# Patient Record
Sex: MALE | Race: WHITE | Employment: FULL TIME | URBAN - METROPOLITAN AREA
[De-identification: names, ages, dates, MRNs, and addresses within clinical notes are randomized per-mention and may not be internally consistent; named-entity substitution may affect disease eponyms.]

---

## 2022-01-04 PROBLEM — M89.8X1 PAIN OF RIGHT SCAPULA: Status: ACTIVE | Noted: 2022-01-04

## 2022-01-10 ENCOUNTER — HOSPITAL ENCOUNTER (OUTPATIENT)
Dept: PHYSICAL THERAPY | Age: 57
Discharge: HOME | End: 2022-01-10
Attending: ORTHOPAEDIC SURGERY
Payer: COMMERCIAL

## 2022-01-10 DIAGNOSIS — M89.8X1 PAIN OF RIGHT SCAPULA: ICD-10-CM

## 2022-01-10 DIAGNOSIS — S24.8XXA INJURY OF LONG THORACIC NERVE, INITIAL ENCOUNTER: ICD-10-CM

## 2022-01-10 DIAGNOSIS — G25.89 SCAPULAR DYSKINESIS: ICD-10-CM

## 2022-01-10 DIAGNOSIS — M25.511 RIGHT SHOULDER PAIN, UNSPECIFIED CHRONICITY: ICD-10-CM

## 2022-01-10 PROCEDURE — 97140 MANUAL THERAPY 1/> REGIONS: CPT

## 2022-01-10 PROCEDURE — 97110 THERAPEUTIC EXERCISES: CPT

## 2022-01-10 PROCEDURE — 97161 PT EVAL LOW COMPLEX 20 MIN: CPT

## 2022-01-10 NOTE — PROGRESS NOTES
George BLAIR Delio  : 1965  Payor: Paulina Tiffany / Plan: [facility] / Product Type: PPO /  [address] [place] [address], 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 Pain: Initial:4/10  Medications Last Reviewed:  1/10/2022     Post Session: 3/10   Updated Objective Findings: See Initial Eval for more details. TREATMENT:   THERAPEUTIC EXERCISE: (15 minutes):  Exercises per grid below to improve mobility, strength and balance. Required minimal visual, verbal and manual cues to promote proper body alignment and promote proper body posture. Progressed resistance and complexity of movement as indicated.      Date:  1/10/2022 Date:   Date:     Activity/Exercise Parameters Parameters Parameters   Education HEP, POC, PT goals, anatomy/pathology     UBE      Rings      PCS [id number]     Doorway stretch 2 way [id number]     UT LS stretch [id number]     Rhomboid hang stretch [id number] Bilateral ER with SR 2x10 orange                       THERAPEUTIC ACTIVITY: ( 0 minutes): Activities per gid below to improve functional movement related mobility, strength and balance to improve neuro-muscular carryover to daily functional activities for improving patient's quality of life. Required visual, verbal and manual cues to promote proper body alignment and promote proper body posture/mechanics. Progressed resistance and complexity of movement as indicated. Date:  1/10/2022 Date:   Date:     Activity/Exercise Parameters Parameters Parameters                                                                               MANUAL THERAPY: (8 minutes): Joint mobilization, Soft tissue mobilization was utilized and necessary because of the patient's restricted joint motion and restricted motion of soft tissue mobility. Date  1/10/2022    Technique Used Grade  Level # Time(s) Effect while being performed          Scapular mobilization II III right 5min Improved Mobility          GH Mobs III right 3min  Improved FE                                     HEP Log Date 1.    1/10/2022   2.  1/10/2022   3. 1/10/2022   4.    5.           [employer] Portal  Treatment/Session Summary:    Response to Treatment: Pt demonstrated understanding of POC and initial HEP. No increase in pain or adverse reactions. Communication/Consultation:  POC, HEP, PT goals, Faxed initial evaluation to MD.   Equipment provided today: HEP Handout   Recommendations/Intent for next treatment session:   Next visit will focus on Manual Therapy Core Stability Pain Science Education RTC strengthening soft tissue mobilization Scapular strengthening. Treatment Plan of Care Effective Dates: 1/10/2022 TO 3/11/2022 (60 days).   Frequency/Duration: 2 times a week for 60 Days             Total Treatment Billable Duration:   23  Rx plus Eval   PT Patient Time In/Time Out  Time In: 0930  Time Out: [address] [facility] Fifi Rome, PT    Future Appointments   Date Time Provider Yissel Das   1/13/2022  2:30 PM Henrey Radar, PT [facility]   1/17/2022  1:45 PM [facility]   1/20/2022  2:30 PM [facility] Radar, [facility]   1/24/2022  1:45 PM [facility] Radar, [facility]   1/27/2022  2:30 PM [facility] Radar, PT [facility]   1/31/2022  1:45 PM Henrey Radar, [facility]   2/3/2022  2:30 PM [facility] Radar, [facility]   2/7/2022  1:45 PM Henrey Radar, [facility]   2/10/2022  2:30 PM Henrey Radar, [facility]   2/14/2022  1:45 PM Henrey Radar, PT [facility]   2/17/2022  2:30 PM Henrey Radar, [facility]   2/21/2022  1:45 PM Hen[facility] Radar, [facility]   2/24/2022  2:30 PM Henrey Radar, PT [facility]

## 2022-01-10 NOTE — THERAPY EVALUATION
George Kebede  : 1965      Payor: Lavern Cruz / Plan: [place] [employer] [facility] / Product Type: PPO /    Dawit Nena at [address] [facility]. 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 Sherrell Clark presents to physical therapy with decreased strength, ROM, joint mobility, functional mobility. These S/S are consistent with referring dignosis. Patient will benefit from skilled physical therapy for manual therapeutic techniques (as appropriate), therapeutic exercises and activities, neuromuscular re-education, and comprehensive home exercises program to address current impairments and functional limitations. George Kebede will benefit from skilled PT (medically necessary) in order to address above deficits affecting participation in basic ADLs and overall functional tolerance.     PROBLEM LIST (Impacting functional limitations):  · Decreased Strength  · Decreased ADL/Functional Activities  · Increased Pain  · Decreased Activity Tolerance  · Increased Shortness of Breath  · Decreased Flexibility/Joint Mobility  · Decreased [employer] with Home Exercise Program INTERVENTIONS PLANNED:  1. Cold  2. Family Education  3. Home Exercise Program (HEP)  4. Manual Therapy  5. Neuromuscular Re-education/Strengthening  6. Range of Motion (ROM)  7. Therapeutic Activites  8. Therapeutic Exercise/Strengthening  9. Transfer Training  10. Ultrasound   TREATMENT PLAN:  Effective Dates: 1/10/2022 TO 3/11/2022 (60 days). Frequency/Duration: 2 times a week for 60 Days  GOALS: (Goals have been discussed and agreed upon with patient.)     Short-Term Goals~4 weeks  Goal Met   1. George Kebede will report <=4/10 pain with driving as well as minimal/no difficulty. 1.  [] Date:   2. George PINTO Peoples will demonstrate improvement in active shoulder FE to >160 degrees to increase UE function and participation in ADLs. 2.  [] Date:   3. George Kebede will demonstrate demonstrate improvement in active shoulder Abd to >150 degrees to increase UE function and participation in ADLs. 3.  [] Date:   4. Mary Marie will show a greater than 3 point decrease on the DASH in order to show an increase in upper extremity function. 4.  [] Date:   5. George Kebede will be independent in all HEP 5.  [] Date:   6.  6.  [] Date:         Long Term Goals~8 weeks Goal Met   1. George Kebede will show full ROM of the UE in order to return to full functional mobility  1. [] Date:   2. Mary Marie will show a greater than 6 point decrease on the DASH in order to show an increase in upper extremity function 2. [] Date:   3. Mary Marie will report doing hair/bathing without difficulty and <=2/10 pain in order to be independent with ADL's 3.  [] Date:   4. George Kebede will be independent in all advanced HEP 4.  [] Date:            Outcome Measure: Tool Used: Disabilities of the Arm, Shoulder and Hand (DASH) Questionnaire - Quick Version  Score:  Initial: 17/55  Most Recent: X/55 (Date: -- )   Interpretation of Score:  The DASH is designed to measure the activities of daily living in person's with upper extremity dysfunction or pain. Each section is scored on a 1-5 scale, 5 representing the greatest disability. The scores of each section are added together for a total score of 55. Medical Necessity:   · Skilled intervention continues to be required due to deficits and impairments seen upon initial evaluation affecting patient's participation in ADLs and functional tasks. Reason for Services/Other Comments:  · Patient continues to require skilled intervention due to deficits and impairments seen upon initial evaluation affecting patient's participation in ADLs and functional tasks. Total Treatment Duration:  PT Patient Time In/Time Out  Time In: 0930  Time Out: 1030    Rehabilitation Potential For Stated Goals: excellent  Regarding George PINTO Peoples's therapy, I certify that the treatment plan above will be carried out by a therapist or under their direction. Thank you for this referral,  Juwan Dominguez, PT     Referring Physician Signature: Keily Daigle MD _______________________________ Date _____________            HISTORY:   History of Present Injury/Illness (Reason for Referral):  Patient reports hitting the right side of mid back at scapula on a boxed wall receptacle while sitting down. He states having evaluation performed by MD for Cervical or Thoracic involvement with negative results. He has had a recent orthopedic evaluation Right Injury of long thoracic nerve and Scapular dyskinesis. -Present symptoms/complaints (on day of evaluation)  Pain Scale:  · Current: 4/10  · Best: 0/10  · Worst: 7/10    · Aggravating factors: Lifting, Carrying, Overhead activities and Driving  · Relieving factors: rest by side  · Irritability: Medium (Onset of pain is equal to alleviation)    Dominant Side:  right  Past Medical History/Comorbidities:   Mr. Tatiana Bautista  has no past medical history on file. Mr. Tatiana Bautista  has no past surgical history on file.   Social History/Living Environment:     lives with wife  Prior Level of Function/Work/Activity:  Normal  Other Clinical Tests:         Nerve Conduction Study scheduled  Current Medications:    Current Outpatient Medications:     cyanocobalamin 1,000 mcg tablet, 1,000 mcg daily. , Disp: , Rfl:     DULoxetine (CYMBALTA) 30 mg capsule, , Disp: , Rfl:     gabapentin (NEURONTIN) 300 mg capsule, , Disp: , Rfl:     metFORMIN ER (GLUCOPHAGE XR) 500 mg tablet, , Disp: , Rfl:     ibuprofen (MOTRIN) 800 mg tablet, Take 1 Tab by mouth every eight (8) hours as needed for Pain., Disp: 90 Tab, Rfl: 1    glucose blood VI test strips (blood glucose test) strip, by Not Applicable route three (3) times daily. , Disp: , Rfl:     OneTouch Ultra Blue Test Strip strip, , Disp: , Rfl:     sildenafil citrate (VIAGRA) 100 mg tablet, Take 100 mg by mouth as needed. , Disp: , Rfl:     Tobradex ophthalmic ointment, APPLY BY OPHTHALMIC ROUTE 4 TIMES A DAY X 1 WEEK, 2 TIMES A DAY X 1 WEEK, Disp: , Rfl:         Ambulatory/Rehab Services H2 Model Falls Risk Assessment    Risk Factors:       (1)  Gender [Male] Ability to Rise from Chair:       (0)  Ability to rise in a single movement    Falls Prevention Plan:       No modifications necessary   Total: (5 or greater = High Risk): 1    ©2010 [facility] [employer]. All Rights Reserved. [facility] Patent #[id number]. Federal Law prohibits the replication, distribution or use without written permission from [facility] of [address] [place] [address]       Date Last Reviewed:  1/10/2022   Number of Personal Factors/Comorbidities that affect the Plan of Care: 0: LOW COMPLEXITY   EXAMINATION:   Observation/Orthostatic Postural Assessment:     Forward Head, Rounded Shoulders and Thoracic Kyphosis  Palpation:          Tenderness to distal medial  Boarder of right scapula  ROM:    AROM/PROM         Joint: Eval Date: 1/10/2022  Re-Assess Date:  Re-Assess Date:    ACTIVE ROM (standing) RIGHT LEFT RIGHT LEFT RIGHT LEFT   Shoulder Flexion 145 145           Shoulder Abduction 140 140           Shoulder Internal Rotation (Apley's) T10 T7           Shoulder External Rotation (Apley's) T2 T4           Elbow ROM WNL WNL           PASSIVE ROM (supine)             Shoulder Flexion 155 155           Shoulder Abduction 145 145           Shoulder Internal Rotation WNL WNL           Shoulder External Rotation WNL WNL                                                    Strength:    Joint: Eval Date: 1/10/2022  Re-Assess Date:  Re-Assess Date:     RIGHT LEFT RIGHT LEFT RIGHT LEFT   Shoulder Flexion 4+/5 5/5           Shoulder Abduction  (C5) 4+/5 5/5           Shoulder Internal Rotation 5/5 5/5           Shoulder External Rotation 4+/5 5/5           Elbow Flexion  (C6) 5/5 5/5           Elbow Extension (C7) 5/5 5/5           Scapula 4/5 4+/5                                         Manual:  Joint Directon Grade Treatment Effect   Scapular/Thoracic Lateral Glide and Medial Glide II and III Reproduced Symptoms   GH Distraction and Inferior Glide III Unremarkable             Special Tests:     Ellen: Negative   Neer's: Negative   Doniphan: Negative   Speed:Negative    Neurological Screen: Assessed @ Initial Visit    Radiating symptoms? Yes/No=poorly localized  Functional Mobility:  Assessed @ Initial Visit         Body Structures Involved:  1. Bones  2. Joints  3. Muscles  4. Ligaments Body Functions Affected:  1. Sensory/Pain  2. Neuromusculoskeletal  3. Movement Related Activities and Participation Affected:  1. Mobility  2.  Self Care   Number of elements that affect the Plan of Care: 1-2: LOW COMPLEXITY   CLINICAL PRESENTATION:   Presentation: Stable and uncomplicated: LOW COMPLEXITY   CLINICAL DECISION MAKING:      Use of outcome tool(s) and clinical judgement create a POC that gives a: Clear prediction of patient's progress: LOW COMPLEXITY     See associated treatment note for treatment provided today.     Future Appointments   Date Time Provider Yissel Das   1/13/2022  2:30 PM Boyle Savin, PT [facility]   1/17/2022  1:45 PM Boyle Savin, PT [facility]   1/20/2022  2:30 PM Boyle Savin, PT [facility]   1/24/2022  1:45 PM Ivan [facility], PT [facility]   1/27/2022  2:30 PM Ivan [facility]   1/31/2022  1:45 PM Boyle Savin, [facility]   2/3/2022  2:30 PM Ivan Savin, PT [facility]   2/7/2022  1:45 PM Boyle Savin, PT [facility]   2/10/2022  2:30 PM Boyle Savin, PT [facility]   2/14/2022  1:45 PM Boyle Savin, PT [facility]   2/17/2022  2:30 PM Boyle Savin, PT [facility]   2/21/2022  1:45 PM Ivan Savin, PT [facility]   2/24/2022  2:30 PM Ivan Savin, PT [facility]         Suraj Vazquez, PT

## 2022-01-13 ENCOUNTER — HOSPITAL ENCOUNTER (OUTPATIENT)
Dept: PHYSICAL THERAPY | Age: 57
Discharge: HOME | End: 2022-01-13
Attending: ORTHOPAEDIC SURGERY
Payer: COMMERCIAL

## 2022-01-13 PROCEDURE — 97110 THERAPEUTIC EXERCISES: CPT

## 2022-01-13 NOTE — PROGRESS NOTES
George Kebede  : 1965  Payor: Darcia Boxer / Plan: [facility] / Product Type: PPO /  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 Pain: Initial:2/10  Medications Last Reviewed:  2022     Post Session: 1/10   Updated Objective Findings: See Initial Eval for more details. TREATMENT:   THERAPEUTIC EXERCISE: ( 40 minutes):  Exercises per grid below to improve mobility, strength and balance. Required minimal visual, verbal and manual cues to promote proper body alignment and promote proper body posture. Progressed resistance and complexity of movement as indicated.      Date:  1/10/2022 Date:  2022 Date:     Activity/Exercise Parameters Parameters Parameters   Education HEP, POC, PT goals, anatomy/pathology     UBE   L 3    Rings  [id number]    PCS [id number]    Doorway stretch 2 way [id number] UT LS stretch [id number] [id number]    Rhomboid hang stretch [id number]    Foam roll  3way 5min    Open Book  10x R/L    Bilateral ER with SR 2x10 orange 2x10 red    Pull apart      Row  2x10 red    Low Row  2x10 red    Lat pull                        THERAPEUTIC ACTIVITY: ( 0 minutes): Activities per gid below to improve functional movement related mobility, strength and balance to improve neuro-muscular carryover to daily functional activities for improving patient's quality of life. Required visual, verbal and manual cues to promote proper body alignment and promote proper body posture/mechanics. Progressed resistance and complexity of movement as indicated. Date:  1/13/2022 Date:   Date:     Activity/Exercise Parameters Parameters Parameters                                                                               MANUAL THERAPY: (8 minutes): Joint mobilization, Soft tissue mobilization was utilized and necessary because of the patient's restricted joint motion and restricted motion of soft tissue mobility. Date  1/13/2022    Technique Used Grade  Level # Time(s) Effect while being performed          Scapular mobilization II III right  Improved Mobility          GH Mobs III right  Improved FE                                     HEP Log Date 1.    1/13/2022   2.  1/13/2022   3. 1/13/2022   4.    5.           [employer] Portal  Treatment/Session Summary:    Response to Treatment: Pt demonstrated understanding of POC and initial HEP. No increase in pain or adverse reactions. Communication/Consultation:  POC, HEP, PT goals, Faxed initial evaluation to MD.   Equipment provided today: HEP Handout   Recommendations/Intent for next treatment session:   Next visit will focus on Manual Therapy Core Stability Pain Science Education RTC strengthening soft tissue mobilization Scapular strengthening. Treatment Plan of Care Effective Dates: 1/10/2022 TO 3/11/2022 (60 days). Frequency/Duration: 2 times a week for 60 Days             Total Treatment Billable Duration:   23  Rx plus [address], PT    Future Appointments   Date Time Provider Yissel Das   1/17/2022  1:45 PM Dennice Flirt, PT [facility]   1/20/2022  2:30 PM Dennice Flirt, PT [facility]   1/24/2022  1:45 PM Dennice Flirt, PT [facility]   1/27/2022  2:30 PM Dennice Flirt, PT [facility]   1/31/2022  1:45 PM Dennice Flirt, PT [facility]   2/1/2022  8:15 AM Saira Nguyễn MD [facility]   2/3/2022  2:30 PM Dennice Flirt, PT [facility]   2/7/2022  1:45 PM Dennice Flirt, PT [facility]   2/10/2022  2:30 PM Dennice Flirt, PT [facility]   2/14/2022  1:45 PM Dennice Flirt, PT [facility]   2/17/2022  2:30 PM Dennice Flirt, PT [facility]   2/21/2022  1:45 PM Dennice Flirt, PT [facility]   2/24/2022  2:30 PM Dennice Flirt, PT [facility]

## 2022-01-20 ENCOUNTER — HOSPITAL ENCOUNTER (OUTPATIENT)
Dept: PHYSICAL THERAPY | Age: 57
Discharge: HOME | End: 2022-01-20
Attending: ORTHOPAEDIC SURGERY
Payer: COMMERCIAL

## 2022-01-20 PROCEDURE — 97110 THERAPEUTIC EXERCISES: CPT

## 2022-01-20 NOTE — PROGRESS NOTES
George Kebede  : 1965  Payor: Florencia Kerr / Plan: [facility] / Product Type: PPO /  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 Pain: Initial:4/10  Medications Last Reviewed:  2022     Post Session: 1/10   Updated Objective Findings: See Initial Eval for more details. TREATMENT:   THERAPEUTIC EXERCISE: ( 42 minutes):  Exercises per grid below to improve mobility, strength and balance. Required minimal visual, verbal and manual cues to promote proper body alignment and promote proper body posture. Progressed resistance and complexity of movement as indicated.      Date:  1/10/2022 Date:  2022 Date:  2022   Activity/Exercise Parameters Parameters Parameters   Education HEP, POC, PT goals, anatomy/pathology     UBE  4/4 L 3 4/ L 3   Rings  57s28lgr 41i15qin   PCS 6q90btz 4w55xiu 7w42kwe   Doorway stretch 2 way 6a30euq     UT LS stretch 8n90abc 4z30ank    Rhomboid hang stretch 7h78fog 5b60fgc 7s64qsh   Foam roll  3way 5min 3way 5min   Open Book  10x R/L    Bilateral ER with SR 2x10 orange 2x10 red    Pull apart      Row  2x10 red 2x10 23# cable   Low Row  2x10 red    Lat pull   2x10 27# cable   Push up   2x8 TRX   Wall slide with lift off   2x8 ER band   ER walkout   68x4tzj 3#         THERAPEUTIC ACTIVITY: ( 0 minutes): Activities per gid below to improve functional movement related mobility, strength and balance to improve neuro-muscular carryover to daily functional activities for improving patient's quality of life. Required visual, verbal and manual cues to promote proper body alignment and promote proper body posture/mechanics. Progressed resistance and complexity of movement as indicated. Date:  1/20/2022 Date:   Date:     Activity/Exercise Parameters Parameters Parameters                                                                               MANUAL THERAPY: (8 minutes): Joint mobilization, Soft tissue mobilization was utilized and necessary because of the patient's restricted joint motion and restricted motion of soft tissue mobility. Date  1/20/2022    Technique Used Grade  Level # Time(s) Effect while being performed          Scapular mobilization II III right  Improved Mobility          GH Mobs III right  Improved FE                                     HEP Log Date 1.    1/20/2022   2.  1/20/2022   3. 1/20/2022   4.    5.           [facility] Portal  Treatment/Session Summary:    Response to Treatment: Pt demonstrated understanding of POC and initial HEP. No increase in pain or adverse reactions.    Communication/Consultation:  POC, HEP, PT goals, Faxed initial evaluation to MD.   Equipment provided today: HEP Handout   Recommendations/Intent for next treatment session:   Next visit will focus on Manual Therapy Core Stability Pain Science Education RTC strengthening soft tissue mobilization Scapular strengthening. Treatment Plan of Care Effective Dates: 1/10/2022 TO 3/11/2022 (60 days).   Frequency/Duration: 2 times a week for 60 Days             Total Treatment Billable Duration:   42  Rx  PT Patient Time In/Time Out  Time In: 1430  Time Out: Celsa Pollack 52, PT    Future Appointments   Date Time Provider Yissel Das   1/24/2022  1:45 PM [facility]   1/27/2022  2:30 PM [facility]   1/31/2022  1:45 PM Arbie Dirk, PT [facility]   2/1/2022  8:15 AM Merary England MD [facility]   2/3/2022  2:30 PM Arbie Dirk, PT [facility]   2/7/2022  1:45 PM Arbie Dirk, PT [facility]   2/10/2022  2:30 PM Arbie Dirk, PT [facility]   2/14/2022  1:45 PM Arbie Dirk, PT [facility]   2/17/2022  2:30 PM Arbie Dirk, PT [facility]   2/21/2022  1:45 PM Arbie Dirk, PT [facility]   2/24/2022  2:30 PM Arbie Dirk, PT [facility]

## 2022-01-24 ENCOUNTER — HOSPITAL ENCOUNTER (OUTPATIENT)
Dept: PHYSICAL THERAPY | Age: 57
Discharge: HOME | End: 2022-01-24
Attending: ORTHOPAEDIC SURGERY
Payer: COMMERCIAL

## 2022-01-24 NOTE — PROGRESS NOTES
George Kebede  : 1965  Primary: [facility]  Secondary:  [facility] [place] [address]. [place] [address], [address]  [phone]   [phone]      PHYSICAL THERAPY    Patient unable to attend appointment today, had unexpected dental work done today.     Cecille Odonnell, PT

## 2022-01-27 ENCOUNTER — HOSPITAL ENCOUNTER (OUTPATIENT)
Dept: PHYSICAL THERAPY | Age: 57
Discharge: HOME | End: 2022-01-27
Attending: ORTHOPAEDIC SURGERY
Payer: COMMERCIAL

## 2022-01-27 PROCEDURE — 97110 THERAPEUTIC EXERCISES: CPT

## 2022-01-27 NOTE — PROGRESS NOTES
George Kebede  : 1965  Payor: Faviola Lazaro / Plan: [facility] / Product Type: PPO /  [address] [facility] at [address] [place] [address] [address] [place] [address], [facility], [address] [place] [address]  [phone]   [phone]                                                          Lucien Barahona MD      OUTPATIENT PHYSICAL THERAPY: Daily Treatment Note 2022 Visit Count:  4    ICD-10: Treatment Diagnosis:   Right shoulder pain, unspecified chronicity [M25.511]  Pain of right scapula [M89.8X1]  Injury of long thoracic nerve, 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 Pain: Initial:5/10  Medications Last Reviewed:  2022     Post Session: 2/10   Updated Objective Findings: See Initial Eval for more details. TREATMENT:   THERAPEUTIC EXERCISE: ( 43 minutes):  Exercises per grid below to improve mobility, strength and balance. Required minimal visual, verbal and manual cues to promote proper body alignment and promote proper body posture. Progressed resistance and complexity of movement as indicated.      Date:  1/10/2022 Date:  2022 Date:  2022 Date:  2022   Activity/Exercise Parameters Parameters Parameters    Education HEP, POC, PT goals, anatomy/pathology      UBE  4/4 L 3 4/4 L 3 4/4 L 4   Rings  98i51hcq 21o13yoe 21s24hsr   PCS 0m79rqe 4v21jvj 7t85dmu 3d02opb   Doorway stretch 2 way 3j25aom      UT LS stretch 3m72fqt 9i07vxb     Rhomboid hang stretch 8k63xyn 8m66njb 7k36dka 1w42oyv   Foam roll  3way 5min 3way 5min 5min   Open Book  10x R/L     Bilateral ER with SR 2x10 orange 2x10 red     Pull apart       Row  2x10 red 2x10 23# cable 2x10 27# cable   Low Row  2x10 red     Lat pull   2x10 27# cable 2x10 30# cable   Push up   2x8 TRX 2x8 TRX   Wall slide with lift off   2x8 ER band    ER walkout   54b6qru 3# 62g8bei 3#   Bear hug     2x10 red loop   Punch    2x10 13# cable   Y T    10x each SB   90/90 ER    10x SB         THERAPEUTIC ACTIVITY: ( 0 minutes): Activities per gid below to improve functional movement related mobility, strength and balance to improve neuro-muscular carryover to daily functional activities for improving patient's quality of life. Required visual, verbal and manual cues to promote proper body alignment and promote proper body posture/mechanics. Progressed resistance and complexity of movement as indicated. Date:  1/27/2022 Date:   Date:     Activity/Exercise Parameters Parameters Parameters                                                                               MANUAL THERAPY: (0 minutes): Joint mobilization, Soft tissue mobilization was utilized and necessary because of the patient's restricted joint motion and restricted motion of soft tissue mobility.         Date  1/27/2022    Technique Used Grade  Level # Time(s) Effect while being performed          Scapular mobilization II III right  Improved Mobility          GH Mobs III right  Improved FE                                     HEP Log Date 1.    1/27/2022   2.  1/27/2022   3. 1/27/2022   4.    5.           [facility] Portal  Treatment/Session Summary:    Response to Treatment: Patient was challenged with increased scapular strengthening and reported less pain   Communication/Consultation:  POC, HEP, PT goals, Faxed initial evaluation to MD.   Equipment provided today: HEP Handout   Recommendations/Intent for next treatment session:   Next visit will focus on Manual Therapy Core Stability Pain Science Education RTC strengthening soft tissue mobilization Scapular strengthening. Treatment Plan of Care Effective Dates: 1/10/2022 TO 3/11/2022 (60 days).   Frequency/Duration: 2 times a week for 60 Days             Total Treatment Billable Duration:   43  Rx  PT Patient Time In/Time Out  Time In: 1427  Time Out: [address], PT    Future Appointments   Date Time Provider Yissel Das   1/31/2022  1:45 PM Irwin Moat, PT [facility]   2/1/2022  8:15 AM Janee Pallas, MD [facility]   2/3/2022  2:30 PM Irwin Moat, PT [facility]   2/7/2022  1:45 PM [facility]   2/10/2022  2:30 PM Irwin Moat, [facility]   2/14/2022  1:45 PM Irwin Moat, PT [facility]   2/17/2022  2:30 PM Irwin Moat, PT [facility]   2/21/2022  1:45 PM Irwin Moat, PT [facility]   2/24/2022  2:30 PM Irwin Moat, PT [facility]

## 2022-01-31 ENCOUNTER — HOSPITAL ENCOUNTER (OUTPATIENT)
Dept: PHYSICAL THERAPY | Age: 57
Discharge: HOME | End: 2022-01-31
Attending: ORTHOPAEDIC SURGERY
Payer: COMMERCIAL

## 2022-01-31 PROCEDURE — 97110 THERAPEUTIC EXERCISES: CPT

## 2022-01-31 NOTE — PROGRESS NOTES
Care giver at the bedside asking if they can medicate the pt with their own evening (20:00 Amitiza 24mg/ 2100  Trazodone 50mg/ and Abilify 5mg )      Chalo German RN  07/06/18 0114 George BLAIR Peoples  : 1965  Payor: Paulina Tiffany / Plan: [facility] / Product Type: PPO /  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 TREATMENT:   THERAPEUTIC EXERCISE: ( 42 minutes):  Exercises per grid below to improve mobility, strength and balance. Required minimal visual, verbal and manual cues to promote proper body alignment and promote proper body posture. Progressed resistance and complexity of movement as indicated.      Date:  1/10/2022 Date:  2022 Date:  2022 Date:  2022 Date:  2022   Activity/Exercise Parameters Parameters Parameters     Education HEP, POC, PT goals, anatomy/pathology       UBE  4/4 L 3 4/4 L 3 4/4 L 4 8min hills L4   Rings  63b95wtk 08y06esy 55g71drl 76c30poa   PCS 5j50pph 7t14gmt 0z49woy 6u88esi 0f05utf   Doorway stretch 2 way 1x68ced       UT LS stretch 1j52mxl 4t61kqs      Rhomboid hang stretch 5s13bja 8i99eoo 3l84nup 7k74smm    Foam roll  3way 5min 3way 5min 5min    Open Book  10x R/L      Bilateral ER with SR 2x10 orange 2x10 red      Pull apart        Row  2x10 red 2x10 23# cable 2x10 27# cable 3x8 27# cable   Low Row  2x10 red      Lat pull   2x10 27# cable 2x10 30# cable 3x8 27# cable   Push up   2x8 TRX 2x8 TRX Bar rack 2x10   Wall slide with lift off   2x8 ER band     ER walkout   21g9yzc 3# 72g9dqf 3# 35o0jwz 7#   Bear hug     2x10 red loop    Punch    2x10 13# cable 2x10 13# cable   Y T    10x each SB 2x8 each bench   90/90 ER    10x SB 2x8 each bench   Wall FE slide w/ thoracic rotation     10x each           Radha Riff carry     2/15#  2x2 laps         THERAPEUTIC ACTIVITY: ( 0 minutes): Activities per gid below to improve functional movement related mobility, strength and balance to improve neuro-muscular carryover to daily functional activities for improving patient's quality of life. Required visual, verbal and manual cues to promote proper body alignment and promote proper body posture/mechanics. Progressed resistance and complexity of movement as indicated. Date:  1/31/2022 Date:   Date:     Activity/Exercise Parameters Parameters Parameters                                                                               MANUAL THERAPY: (0 minutes): Joint mobilization, Soft tissue mobilization was utilized and necessary because of the patient's restricted joint motion and restricted motion of soft tissue mobility. Date  1/31/2022    Technique Used Grade  Level # Time(s) Effect while being performed          Scapular mobilization II III right  Improved Mobility          GH Mobs III right  Improved FE                                     HEP Log Date 1.    1/31/2022   2.  1/31/2022   3. 1/31/2022   4. 5.           [facility] Portal  Treatment/Session Summary:    Response to Treatment: Patient had less scapular winging with strengthening activity   Communication/Consultation:  POC, HEP, PT goals, Faxed initial evaluation to MD.   Equipment provided today: HEP Handout   Recommendations/Intent for next treatment session:   Next visit will focus on Manual Therapy Core Stability Pain Science Education RTC strengthening soft tissue mobilization Scapular strengthening. Treatment Plan of Care Effective Dates: 1/10/2022 TO 3/11/2022 (60 days).   Frequency/Duration: 2 times a week for 60 Days             Total Treatment Billable Duration:   42  Rx  PT Patient Time In/Time Out  Time In: 1347  Time Out: [address], PT    Future Appointments   Date Time Provider Yissel Das   2/1/2022  8:15 AM Jaz Samuel MD [facility] MRMD   2/3/2022  2:30 PM Teresia Moros, PT [facility]   2/7/2022  1:45 PM Teresia Moros, PT [facility]   2/10/2022  2:30 PM [facility] Mor[facility], [facility]   2/14/2022  1:45 PM Teresia Moros, PT [facility]   2/17/2022  2:30 PM Teresia Moros, PT [facility]   2/21/2022  1:45 PM Teresia Moros, PT [facility]   2/24/2022  2:30 PM Teresia Moros, [facility]

## 2022-02-03 ENCOUNTER — HOSPITAL ENCOUNTER (OUTPATIENT)
Dept: PHYSICAL THERAPY | Age: 57
Discharge: HOME | End: 2022-02-03
Attending: ORTHOPAEDIC SURGERY
Payer: COMMERCIAL

## 2022-02-03 PROCEDURE — 97110 THERAPEUTIC EXERCISES: CPT

## 2022-02-03 NOTE — PROGRESS NOTES
George Kebede  : 1965  Payor: Albertina Russo / Plan: [facility] / Product Type: PPO /  Tes[address] Paulino at [address] 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 TREATMENT:   THERAPEUTIC EXERCISE: ( 43 minutes):  Exercises per grid below to improve mobility, strength and balance. Required minimal visual, verbal and manual cues to promote proper body alignment and promote proper body posture. Progressed resistance and complexity of movement as indicated.      Date:  2022 Date:  2022 Date:  2022 Date:  2022 Date:  2/3/2022   Activity/Exercise Parameters Parameters      Education        UBE 4/4 L 3 4/4 L 3 4/4 L 4 8min hills L4 8min hills L5   29 keyona   Rings 84q75xmc 96q07wsw 63o97myz 46y19irx 84v48izv   PCS 0t57jfq 0p91tir 5g40dde 3c99dab 0g66yux   Doorway stretch        UT LS stretch 7e33huy       Rhomboid hang stretch 8c35rtm 5c14ewm 5d47pwm  8c72mlc   Foam roll 3way 5min 3way 5min 5min     Open Book 10x R/L       Bilateral ER with SR 2x10 red       Pull apart        Row 2x10 red 2x10 23# cable 2x10 27# cable 3x8 30# cable 3x8 30# cable   Low Row 2x10 red       Lat pull  2x10 27# cable 2x10 30# cable 3x8 33# cable 3x8 37# cable   Push up  2x8 TRX 2x8 TRX Bar rack 2x10 Bar rack 2x10   Wall slide with lift off  2x8 ER band      ER walkout  90q0apv 3# 02s3itw 3# 84l7yxj 7#    Bear hug    2x10 red loop     Punch   2x10 13# cable 2x10 13# cable 3x5 17# cable   Y T   10x each SB 2x8 each bench 2x10 yellow standing   90/90 ER   10x SB 2x8 each bench 2x10 yellow standing   Wall FE slide w/ thoracic rotation    10x each    Drawing bow     3x10 R/L green   [employer] carry    2/15#  2x2 laps 2/15#  2x2 laps   3 angle ER     2x30 each green                         THERAPEUTIC ACTIVITY: ( 0 minutes): Activities per gid below to improve functional movement related mobility, strength and balance to improve neuro-muscular carryover to daily functional activities for improving patient's quality of life. Required visual, verbal and manual cues to promote proper body alignment and promote proper body posture/mechanics. Progressed resistance and complexity of movement as indicated. Date:  2/3/2022 Date:   Date:     Activity/Exercise Parameters Parameters Parameters                                                                               MANUAL THERAPY: (0 minutes): Joint mobilization, Soft tissue mobilization was utilized and necessary because of the patient's restricted joint motion and restricted motion of soft tissue mobility.         Date  2/3/2022    Technique Used Grade  Level # Time(s) Effect while being performed          Scapular mobilization II III right  Improved Mobility          [facility] right  Improved FE                                     HEP Log Date 1.    2/3/2022   2.  2/3/2022   3. 2/3/2022   4.    5.           [facility] Portal  Treatment/Session Summary:    Response to Treatment: Patient progressing well with strength and stability   Communication/Consultation:  Exercise progression   Equipment provided today: Not Today   Recommendations/Intent for next treatment session:   Next visit will focus on Manual Therapy Core Stability Pain Science Education RTC strengthening soft tissue mobilization Scapular strengthening. Treatment Plan of Care Effective Dates: 1/10/2022 TO 3/11/2022 (60 days).   Frequency/Duration: 2 times a week for 60 Days             Total Treatment Billable Duration:   43  Rx  PT Patient Time In/Time Out  Time In: 1430  Time Out: Celsa Pollack 52, PT    Future Appointments   Date Time Provider Yissel Das   2/7/2022  1:45 PM Constance Panning, PT [facility]   2/10/2022  2:30 PM Constance Panning, PT [facility]   2/14/2022  1:45 PM Constance Panning, PT [facility]   2/17/2022  2:30 PM Constance Panning, PT [facility]   2/21/2022  1:45 PM Constance Panning, [facility]   2/24/2022  2:30 PM [facility]anning, [facility]

## 2022-02-07 ENCOUNTER — HOSPITAL ENCOUNTER (OUTPATIENT)
Dept: PHYSICAL THERAPY | Age: 57
Discharge: HOME | End: 2022-02-07
Attending: ORTHOPAEDIC SURGERY
Payer: COMMERCIAL

## 2022-02-07 PROCEDURE — 97110 THERAPEUTIC EXERCISES: CPT

## 2022-02-07 NOTE — PROGRESS NOTES
George Kebede  : 1965  Payor: Yasmani Azevedo / Plan: [facility] / Product Type: PPO /  [address] 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 Required minimal visual, verbal and manual cues to promote proper body alignment and promote proper body posture. Progressed resistance and complexity of movement as indicated.      Date:  2022 Date:  2022 Date:  2022 Date:  2/3/2022 Date:  2022   Activity/Exercise Parameters       Education        UBE 4/4 L 3 4/4 L 4 8min hills L4 8min hills L5   29 keyona 8min hills L6   31 keyona   Rings 04n04nei 60s11bbf 78l65zbt 81a79gbd 44j23gvd   PCS 9c57vbe 4h37zal 6d82izl 3a48hey 5p35wxx   Doorway stretch        UT LS stretch        Rhomboid hang stretch 3n61cqc 7z72hmi  5k81tdq    Foam roll 3way 5min 5min      Open Book        ER     Cable 3x8 R/L   Pull apart        Row 2x10 23# cable 2x10 27# cable 3x8 30# cable 3x8 30# cable 3x8 33# cable   Low Row        Lat pull 2x10 27# cable 2x10 30# cable 3x8 33# cable 3x8 37# cable 3x8 40# cable   Push up 2x8 TRX 2x8 TRX Bar rack 2x10 Bar rack 2x10    Plant Alt FE        Wall slide with lift off 2x8 ER band       ER walkout 90e5dgk 3# 04n0ysp 3# 66i9kkn 7#  11w8end 7#   Bear hug   2x10 red loop      Punch  2x10 13# cable 2x10 13# cable 3x5 17# cable    Y T  10x each SB 2x8 each bench 2x10 yellow standing 3x8 yellow standing   90/90 ER  10x SB 2x8 each bench 2x10 yellow standing 3x8 yellow standing   Wall FE slide w/ thoracic rotation   10x each     Drawing bow    3x10 R/L green 3x8 R/L red   Stanford carry   2/15#  2x2 laps 2/15#  2x2 laps 2/20#  2x2 laps   3 angle ER    2x30 each green    Ball wall CW CCW     2x20 each red   Bent over row              THERAPEUTIC ACTIVITY: ( 0 minutes): Activities per gid below to improve functional movement related mobility, strength and balance to improve neuro-muscular carryover to daily functional activities for improving patient's quality of life. Required visual, verbal and manual cues to promote proper body alignment and promote proper body posture/mechanics. Progressed resistance and complexity of movement as indicated. Date:  2/7/2022 Date:   Date:     Activity/Exercise Parameters Parameters Parameters                                                                               MANUAL THERAPY: (0 minutes): Joint mobilization, Soft tissue mobilization was utilized and necessary because of the patient's restricted joint motion and restricted motion of soft tissue mobility.         Date  2/7/2022    Technique Used Grade Level # Time(s) Effect while being performed          Scapular mobilization II III right  Improved Mobility          GH Mobs III right  Improved FE                                     HEP Log Date 1.    2/7/2022   2.  2/7/2022   3. 2/7/2022   4.    5.           [employer] Portal  Treatment/Session Summary:    Response to Treatment: Patient was challenged with increase but without right scapular pain   Communication/Consultation:  Exercise progression   Equipment provided today: Not Today   Recommendations/Intent for next treatment session:   Next visit will focus on Manual Therapy Core Stability Pain Science Education RTC strengthening soft tissue mobilization Scapular strengthening. Treatment Plan of Care Effective Dates: 1/10/2022 TO 3/11/2022 (60 days).   Frequency/Duration: 2 times a week for 60 Days             Total Treatment Billable Duration:   43  Rx  PT Patient Time In/Time Out  Time In: 1345  Time Out: 96 Celsa Rodriguez PT    Future Appointments   Date Time Provider Yissel Das   2/10/2022  2:30 PM Natividad Danger, PT [facility] [place] [facility]   2/14/2022  1:45 PM Natividad Danger, PT [facility]   2/17/2022  2:30 PM Natividad Danger, PT [facility]   2/22/2022  1:00 PM Natividad Danger, PT [facility]   2/24/2022  2:30 PM Natividad Danger, PT [facility]   2/28/2022  1:45 PM Natividad Danger, PT [facility]   3/3/2022  2:30 PM Natividad Danger, PT [facility]   3/7/2022  2:30 PM Natividad Danger, PT [facility]   3/10/2022  2:30 PM Natividad Danger, PT [facility]   3/14/2022  1:45 PM Natividad Danger, PT [facility]   3/17/2022  2:30 PM Natividad Danger, PT [facility]   3/21/2022  1:45 PM Natividad Danger, PT [facility]   3/24/2022  2:30 PM Natividad Danger, PT [facility]

## 2022-02-10 ENCOUNTER — HOSPITAL ENCOUNTER (OUTPATIENT)
Dept: PHYSICAL THERAPY | Age: 57
Discharge: HOME | End: 2022-02-10
Attending: ORTHOPAEDIC SURGERY
Payer: COMMERCIAL

## 2022-02-10 PROCEDURE — 97110 THERAPEUTIC EXERCISES: CPT

## 2022-02-10 NOTE — PROGRESS NOTES
George Kebede  : 1965  Payor: Eriberto Stinson / Plan: [facility] / Product Type: PPO /  [address] 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 Required minimal visual, verbal and manual cues to promote proper body alignment and promote proper body posture. Progressed resistance and complexity of movement as indicated.      Date:  2022 Date:  2022 Date:  2/3/2022 Date:  2022 Date:  2/10/2022   Activity/Exercise        Education        UBE 4/4 L 4 8min hills L4 8min hills L5   29 keyona 8min hills L6   31 keyona 8min hills L6   33 keyona   Rings 06y59ncf 59x32boo 84o14vdy 51c94pjg 81y08zye   PCS 4r26duy 9c33cgm 4m03vuo 2x42suf 7q26los   Doorway stretch        Rhomboid hang stretch 1t44nxc  7b30xof  8t76bas   Foam roll 5min       ER    Cable 3x8 R/L Cable 2x10 3# R/L   Row 2x10 27# cable 3x8 30# cable 3x8 30# cable 3x8 33# cable Bent over 2x10 15# R/L   Low Row        Lat pull 2x10 30# cable 3x8 33# cable 3x8 37# cable 3x8 40# cable 3x8 40# cable   Push up 2x8 TRX Bar rack 2x10 Bar rack 2x10  SB chair 2x10   Plank Alt FE        Wall slide with lift off        Wall walk lateral        ER walkout 62w0phz 3# 91p2lyx 7#  54g4phu 7#    Bear hug  2x10 red loop       Punch 2x10 13# cable 2x10 13# cable 3x5 17# cable     Y T 10x each SB 2x8 each bench 2x10 yellow standing 3x8 yellow standing SB 2x10 2#   90/90 ER 10x SB 2x8 each bench 2x10 yellow standing 3x8 yellow standing SB 2x10 2#   Wall FE slide w/ thoracic rotation  10x each      Drawing bow   3x10 R/L green 3x8 R/L red    Stanford carry  2/15#  2x2 laps 2/15#  2x2 laps 2/20#  2x2 laps    3 angle ER   2x30 each green  2x30 each red   Ball wall CW CCW    2x20 each red 2x20 each red   Overhead press     2/5# 2x10   TRX        RDL overhead        Squat to press        Lateral walk with punch                      THERAPEUTIC ACTIVITY: ( 0 minutes): Activities per gid below to improve functional movement related mobility, strength and balance to improve neuro-muscular carryover to daily functional activities for improving patient's quality of life. Required visual, verbal and manual cues to promote proper body alignment and promote proper body posture/mechanics. Progressed resistance and complexity of movement as indicated.      Date:  2/10/2022 Date:   Date:     Activity/Exercise Parameters Parameters Parameters                                                                               MANUAL THERAPY: (0 minutes): Joint mobilization, Soft tissue mobilization was utilized and necessary because of the patient's restricted joint motion and restricted motion of soft tissue mobility. Date  2/10/2022    Technique Used Grade  Level # Time(s) Effect while being performed          Scapular mobilization II III right  Improved Mobility          GH Mobs III right  Improved FE                                     HEP Log Date 1.    2/10/2022   2.  2/10/2022   3. 2/10/2022   4.    5.           [employer] Portal  Treatment/Session Summary:    Response to Treatment: Patient progressing endurance with strengthening activity   Communication/Consultation:  Exercise progression   Equipment provided today: Not Today   Recommendations/Intent for next treatment session:   Next visit will focus on Manual Therapy Core Stability Pain Science Education RTC strengthening soft tissue mobilization Scapular strengthening. Treatment Plan of Care Effective Dates: 1/10/2022 TO 3/11/2022 (60 days).   Frequency/Duration: 2 times a week for 60 Days             Total Treatment Billable Duration:   43  Rx  PT Patient Time In/Time Out  Time In: 1425  Time Out: Celsa Pollack 51, PT    Future Appointments   Date Time Provider Yissel Das   2/14/2022  1:45 PM Jolynn Eliecer, PT [facility] [place] [facility]   2/17/2022  2:30 PM Jolynn Eliecer, PT [facility]   2/22/2022  1:00 PM Jolynn Eliecer, PT [facility]   2/24/2022  2:30 PM Jolynn Eliecer, PT [facility]   2/28/2022  1:45 PM Jolynn Eliecer, PT [facility]   3/3/2022  2:30 PM Jolynn Eliecer, PT [facility]   3/7/2022  2:30 PM Jolynn Eliecer, PT [facility]   3/10/2022  2:30 PM Jolynn Eliecer, PT [facility]   3/14/2022  1:45 PM Jolynn Eliecer, PT [facility]   3/17/2022  2:30 PM Jolynn Eliecer, PT [facility]   3/21/2022  1:45 PM Jolynn Eliecer, PT [facility]   3/24/2022  2:30 PM Jolynn Eliecer, PT [facility]

## 2022-02-14 ENCOUNTER — HOSPITAL ENCOUNTER (OUTPATIENT)
Dept: PHYSICAL THERAPY | Age: 57
Discharge: HOME | End: 2022-02-14
Attending: ORTHOPAEDIC SURGERY
Payer: COMMERCIAL

## 2022-02-14 PROCEDURE — 97110 THERAPEUTIC EXERCISES: CPT

## 2022-02-14 NOTE — PROGRESS NOTES
George Kebede  : 1965  Payor: Florencia Kerr / Plan: [facility] / Product Type: PPO /  [address] [facility] at [address] [place] [address] [address] [place] [address], [facility], [address] [place] [address] Road  [phone]   [phone]                                                          Sintia Howe MD      OUTPATIENT PHYSICAL THERAPY: Daily Treatment Note 2022 Visit Count:  9    ICD-10: Treatment Diagnosis:   Right shoulder pain, unspecified chronicity [M25.511]  Pain of right scapula [M89.8X1]  Injury of long thoracic nerve, 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 Required minimal visual, verbal and manual cues to promote proper body alignment and promote proper body posture. Progressed resistance and complexity of movement as indicated.      Date:  2022 Date:  2/3/2022 Date:  2022 Date:  2/10/2022 Date:  2022   Activity/Exercise        Education        UBE 8min hills L4 8min hills L5   29 keyona 8min hills L6   31 keyona 8min hills L6   33 keyona 8min hills L6   31 keyona   Rings 63v08tfz 74m20fig 48j67vlb 95b29zru 34x73rmo PCS 6t22joy 6v63xak 5g43urf 0c22lea 9d53kzu   Doorway stretch        Rhomboid hang stretch  4e70sxi  2c70tul 3e02yik   Foam roll        ER   Cable 3x8 R/L Cable 2x10 3# R/L    Row 3x8 30# cable 3x8 30# cable 3x8 33# cable Bent over 2x10 15# R/L    Lat pull 3x8 33# cable 3x8 37# cable 3x8 40# cable 3x8 40# cable    Push up Bar rack 2x10 Bar rack 2x10  SB chair 2x10    Wall slide with lift off        Wall walk lateral        ER walkout 55c5cgr 7#  50n2rct 7#     Punch 2x10 13# cable 3x5 17# cable      Y T 2x8 each bench 2x10 yellow standing 3x8 yellow standing SB 2x10 2#    90/90 ER 2x8 each bench 2x10 yellow standing 3x8 yellow standing SB 2x10 2#    Drawing bow  3x10 R/L green 3x8 R/L red  3x8 R/L red   Stanford carry 2/15#  2x2 laps 2/15#  2x2 laps 2/20#  2x2 laps  2/20#  2x2 laps   Overhead carry     2x 1/2 lap 5#    3 angle ER  2x30 each green  2x30 each red    Ball wall CW CCW   2x20 each red 2x20 each red    Overhead press    2/5# 2x10 2/5# 2x10   TRX Row     3x10   TRX Press     2x10   RDL overhead     NV   Squat to press     4x5   Lateral walk with punch     3# 3x20' green   Monster walk     3x20' green   3 way hip swing     Airex 2x10 R/L         THERAPEUTIC ACTIVITY: ( 0 minutes): Activities per gid below to improve functional movement related mobility, strength and balance to improve neuro-muscular carryover to daily functional activities for improving patient's quality of life. Required visual, verbal and manual cues to promote proper body alignment and promote proper body posture/mechanics. Progressed resistance and complexity of movement as indicated.      Date:  2/14/2022 Date:   Date:     Activity/Exercise Parameters Parameters Parameters                                                                               MANUAL THERAPY: (0 minutes): Joint mobilization, Soft tissue mobilization was utilized and necessary because of the patient's restricted joint motion and restricted motion of soft tissue mobility. Date  2/14/2022    Technique Used Grade  Level # Time(s) Effect while being performed          Scapular mobilization II III right  Improved Mobility          GH Mobs III right  Improved FE                                     HEP Log Date 1.    2/14/2022   2.  2/14/2022   3. 2/14/2022   4.    5.           [employer] Portal  Treatment/Session Summary:    Response to Treatment: Patient was progressed with dynamic strengthening activity with good tolerance    Communication/Consultation:  Exercise progression   Equipment provided today: Not Today   Recommendations/Intent for next treatment session:   Next visit will focus on Manual Therapy Core Stability Pain Science Education RTC strengthening soft tissue mobilization Scapular strengthening. Treatment Plan of Care Effective Dates: 1/10/2022 TO 3/11/2022 (60 days).   Frequency/Duration: 2 times a week for 60 Days             Total Treatment Billable Duration:   42  Rx  PT Patient Time In/Time Out  Time In: 1340  Time Out: 96 Celsa Rodriguez PT    Future Appointments   Date Time Provider Yissel Das   2/17/2022  2:30 PM Bambi Frye, PT [facility]   2/22/2022  1:00 PM Bambi Frye, PT [facility] AND [place] [facility]   2/24/2022  2:30 PM Bambi Frye, PT [facility]   2/28/2022  1:45 PM Bambi Frye, PT [facility]   3/3/2022  2:30 PM Bambi Frye, PT [facility]   3/7/2022  2:30 PM Bambi Frye, PT [facility]   3/10/2022  2:30 PM Bambi Frye, PT [facility]   3/14/2022  1:45 PM Bambi Frye, PT [facility]   3/17/2022  2:30 PM Bambi Frye, PT [facility]   3/21/2022  1:45 PM Bambi Frye, PT [facility]   3/24/2022  2:30 PM Bambi Frye, PT [facility]

## 2022-02-17 ENCOUNTER — HOSPITAL ENCOUNTER (OUTPATIENT)
Dept: PHYSICAL THERAPY | Age: 57
Discharge: HOME | End: 2022-02-17
Attending: ORTHOPAEDIC SURGERY
Payer: COMMERCIAL

## 2022-02-17 PROCEDURE — 97110 THERAPEUTIC EXERCISES: CPT

## 2022-02-17 NOTE — PROGRESS NOTES
George Kebede  : 1965  Payor: Sharon Monreal / Plan: [facility] / Product Type: PPO /  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 Required minimal visual, verbal and manual cues to promote proper body alignment and promote proper body posture. Progressed resistance and complexity of movement as indicated.      Date:  2/3/2022 Date:  2022 Date:  2/10/2022 Date:  2022 Date:  2022   Activity/Exercise        Education        UBE 8min hills L5   29 keyona 8min hills L6   31 keyona 8min hills L6   33 keyona 8min hills L6   31 keyona 8min hills L6   31 keyona   Rings 69b31ckz 21w42smf 93p04ozh 06e09ovv 35s82csq   PCS 7o27cfm 5c09jcn 6d52glz 2l54lzg 3z10hks   Doorway stretch        Rhomboid hang stretch 8n19xfk  7a70ebh 0w92hje 5o92keu   Foam roll        ER  Cable 3x8 R/L Cable 2x10 3# R/L     Row 3x8 30# cable 3x8 33# cable Bent over 2x10 15# R/L  Bent over bar 3x10 45#   Lat pull 3x8 37# cable 3x8 40# cable 3x8 40# cable  3x8 43# cable   Push up Bar rack 2x10  SB chair 2x10     Wall slide with lift off        Wall walk lateral        ER walkout  59n7qcb 7#      Punch 3x5 17# cable       Y T 2x10 yellow standing 3x8 yellow standing SB 2x10 2#     90/90 ER 2x10 yellow standing 3x8 yellow standing SB 2x10 2#     Drawing bow 3x10 R/L green 3x8 R/L red  3x8 R/L red    Stanford carry 2/15#  2x2 laps 2/20#  2x2 laps  2/20#  2x2 laps 2/20#  2x2 laps   Overhead carry    2x 1/2 lap 5#     3 angle ER 2x30 each green  2x30 each red     Ball wall CW CCW  2x20 each red 2x20 each red     Overhead press   2/5# 2x10 2/5# 2x10    TRX Row    3x10    TRX Press    2x10    RDL overhead    NV 2x10 5#   Squat to Upright row     3x5  15#   Squat to press    4x5 4x5  5#   Lateral walk with punch    3# 3x20' green 3x20' pink UE press red   Monster walk    3x20' green 3x20' pink  UE press red   3 way hip swing    Airex 2x10 R/L          THERAPEUTIC ACTIVITY: ( 0 minutes): Activities per gid below to improve functional movement related mobility, strength and balance to improve neuro-muscular carryover to daily functional activities for improving patient's quality of life. Required visual, verbal and manual cues to promote proper body alignment and promote proper body posture/mechanics. Progressed resistance and complexity of movement as indicated.      Date:  2/17/2022 Date:   Date:     Activity/Exercise Parameters Parameters Parameters                                                                               MANUAL THERAPY: (0 minutes): Joint mobilization, Soft tissue mobilization was utilized and necessary because of the patient's restricted joint motion and restricted motion of soft tissue mobility. Date  2/17/2022    Technique Used Grade  Level # Time(s) Effect while being performed          Scapular mobilization II III right  Improved Mobility          GH Mobs III right  Improved FE                                     HEP Log Date 1.    2/17/2022   2.  2/17/2022   3. 2/17/2022   4.    5.           [employer] Portal  Treatment/Session Summary:    Response to Treatment: Patient was challenged with increased stability strengthening but without pain   Communication/Consultation:  Exercise progression   Equipment provided today: Not Today   Recommendations/Intent for next treatment session:   Next visit will focus on Manual Therapy Core Stability Pain Science Education RTC strengthening soft tissue mobilization Scapular strengthening. Treatment Plan of Care Effective Dates: 1/10/2022 TO 3/11/2022 (60 days).   Frequency/Duration: 2 times a week for 60 Days             Total Treatment Billable Duration:   43  Rx  PT Patient Time In/Time Out  Time In: 1430  Time Out: Celsa Pollack 52, PT    Future Appointments   Date Time Provider Yissel Das   2/22/2022  1:00 PM Natividad Danger, PT [facility] AND [place] [facility]   2/24/2022  2:30 PM Natividad Danger, PT [facility] [place] [facility]   2/28/2022  1:45 PM Natividad Danger, PT [facility]   3/3/2022  2:30 PM Natividad Danger, PT [facility]   3/7/2022  2:30 PM Natividad Danger, PT [facility]   3/10/2022  2:30 PM Natividad Danger, PT [facility]   3/14/2022  1:45 PM Natividad Danger, PT [facility]   3/17/2022  2:30 PM Natividad Danger, PT [facility] AND [place] [facility]   3/21/2022  1:45 PM Natividad Danger, PT [facility]   3/24/2022  2:30 PM Natividad Danger, PT [facility]

## 2022-02-17 NOTE — THERAPY DISCHARGE
George Kebede  : 1965      Payor: Gurinder Bermudez / Plan: [facility]ly [facility] / Product Type: PPO /    Ines Turcios at [facility]. 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 5 = High Risk)  MD Orders: Eval and Treat  MEDICAL/REFERRING DIAGNOSIS:   Right shoulder pain, unspecified chronicity   DATE OF ONSET: 2021  REFERRING PHYSICIAN: Sherlyn Barksdale MD  RETURN PHYSICIAN APPOINTMENT: TBD by patient      INITIAL ASSESSMENT:   Mr. Maura Ac presents to physical therapy with decreased strength, ROM, joint mobility, functional mobility. These S/S are consistent with referring dignosis. Patient will benefit from skilled physical therapy for manual therapeutic techniques (as appropriate), therapeutic exercises and activities, neuromuscular re-education, and comprehensive home exercises program to address current impairments and functional limitations. eGorge Kebede will benefit from skilled PT (medically necessary) in order to address above deficits affecting participation in basic ADLs and overall functional tolerance. DISCONTINUATION: Nahomi Kebede has been seen in physical therapy from 1/10/2022 to 2022 for 10 visits. Treatment has been discontinued at this time due to Patient decided to self discharge based on out of pocket cost and doing much better. The below goals were met prior to discontinuation. Some goals were not met due to unable to reassess.    Thank you for this referral.          PROBLEM LIST (Impacting functional limitations):  · Decreased Strength  · Decreased ADL/Functional Activities  · Increased Pain  · Decreased Activity Tolerance  · Increased Shortness of Breath  · Decreased Flexibility/Joint Mobility  · Decreased [place] with Home Exercise Program INTERVENTIONS PLANNED:  1. Cold  2. Family Education  3. Home Exercise Program (HEP)  4. Manual Therapy  5. Neuromuscular Re-education/Strengthening  6. Range of Motion (ROM)  7. Therapeutic Activites  8. Therapeutic Exercise/Strengthening  9. Transfer Training  10. Ultrasound   TREATMENT PLAN:  Effective Dates: 1/10/2022 TO 3/11/2022 (60 days). Frequency/Duration: 2 times a week for 60 Days  GOALS: (Goals have been discussed and agreed upon with patient.)     Short-Term Goals~4 weeks  Goal Met   1. George Kebede will report <=4/10 pain with driving as well as minimal/no difficulty. 1.  [x] Date: 2/17/2022   2. George PINTO Peoples will demonstrate improvement in active shoulder FE to >160 degrees to increase UE function and participation in ADLs. 2.  [x] Date: 2/17/2022   3. George PINTO Peoples will demonstrate demonstrate improvement in active shoulder Abd to >150 degrees to increase UE function and participation in ADLs. 3.  [x] Date: 2/17/2022   4. Lorenzo Clifton will show a greater than 3 point decrease on the DASH in order to show an increase in upper extremity function. 4.  [] Date:   5. George PINTO Peoples will be independent in all HEP 5. [x] Date: 2/17/2022         Long Term Goals~8 weeks Goal Met   1. George PINTO Peoples will show full ROM of the UE in order to return to full functional mobility  1. [x] Date: 2/17/2022   2. Lorenzo Clifton will show a greater than 6 point decrease on the DASH in order to show an increase in upper extremity function 2. [] Date:   3. Lorenzo Clifton will report doing hair/bathing without difficulty and <=2/10 pain in order to be independent with ADL's 3. [x] Date: 2/17/2022   4.  George PINTO Peoples will be independent in all advanced HEP 4. [x] Date: 2/17/2022            Outcome Measure: Tool Used: Disabilities of the Arm, Shoulder and Hand (DASH) Questionnaire - Quick Version  Score:  Initial: 17/55  Most Recent: X/55 (Date: -- )   Interpretation of Score: The DASH is designed to measure the activities of daily living in person's with upper extremity dysfunction or pain. Each section is scored on a 1-5 scale, 5 representing the greatest disability. The scores of each section are added together for a total score of 55. Observation/Orthostatic Postural Assessment: Forward Head, Rounded Shoulders and Thoracic Kyphosis  Palpation:          Mild Tenderness to distal medial  Boarder of right scapula  ROM:    AROM/PROM     Joint:  2/17/2022    ACTIVE ROM (standing) RIGHT LEFT   Shoulder Flexion 160 160   Shoulder Abduction 160 160   Shoulder Internal Rotation (Apley's) T8 T7   Shoulder External Rotation (Apley's) T4 T4   Elbow ROM WNL WNL   PASSIVE ROM (supine)     Shoulder Flexion WNL WNL   Shoulder Abduction WNL WNL   Shoulder Internal Rotation WNL WNL   Shoulder External Rotation WNL WNL                    Strength:    Joint:  2/17/2022     RIGHT LEFT   Shoulder Flexion 5/5 5/5   Shoulder Abduction  (C5) 5/5 5/5   Shoulder Internal Rotation 5/5 5/5   Shoulder External Rotation 5/5 5/5   Elbow Flexion  (C6) 5/5 5/5   Elbow Extension (C7) 5/5 5/5   Scapula 4+/5 4+/5                   Special Tests:     Ellen: Negative   Neer's: Negative   [place]: Negative   Speed:Negative    Neurological Screen:     Radiating symptoms? No  Functional Mobility:  Normal       Reason for Services/Other Comments:  Mark Peng Aarons has been seen in physical therapy from 1/10/2022 to 2/17/2022 for 10 visits. Treatment has been discontinued at this time due to Patient decided to self discharge based on out of pocket cost and doing much better. The above goals were met prior to discontinuation.    Some goals were not met due to unable to reassess. Thank you for this referral.         Regarding Susana PINTO Peoples's therapy, I certify that the treatment plan above will be carried out by a therapist or under their direction. Thank you for this referral,  Cristina Yanes PT     Referring Physician Signature: Mike Miller MD No Signature is Required for this note.

## 2022-02-21 ENCOUNTER — APPOINTMENT (OUTPATIENT)
Dept: PHYSICAL THERAPY | Age: 57
End: 2022-02-21
Attending: ORTHOPAEDIC SURGERY
Payer: COMMERCIAL

## 2022-02-22 ENCOUNTER — APPOINTMENT (OUTPATIENT)
Dept: PHYSICAL THERAPY | Age: 57
End: 2022-02-22
Attending: ORTHOPAEDIC SURGERY
Payer: COMMERCIAL

## 2022-02-24 ENCOUNTER — HOSPITAL ENCOUNTER (OUTPATIENT)
Dept: PHYSICAL THERAPY | Age: 57
Discharge: HOME | End: 2022-02-24
Attending: ORTHOPAEDIC SURGERY
Payer: COMMERCIAL

## 2022-02-24 NOTE — PROGRESS NOTES
George Kebede  : 1965  Primary: [facility]*  Secondary:  [facility] at . [address]  [address].  [facility], [address]  [phone]   [phone]      PHYSICAL THERAPY    Patient unable to attend appointment today, decided to self discharge based on cost.    Odalis Lynn, PT

## 2022-02-28 ENCOUNTER — APPOINTMENT (OUTPATIENT)
Dept: PHYSICAL THERAPY | Age: 57
End: 2022-02-28
Attending: ORTHOPAEDIC SURGERY
Payer: COMMERCIAL

## 2022-03-03 ENCOUNTER — APPOINTMENT (OUTPATIENT)
Dept: PHYSICAL THERAPY | Age: 57
End: 2022-03-03
Attending: ORTHOPAEDIC SURGERY

## 2022-03-07 ENCOUNTER — APPOINTMENT (OUTPATIENT)
Dept: PHYSICAL THERAPY | Age: 57
End: 2022-03-07
Attending: ORTHOPAEDIC SURGERY

## 2022-03-10 ENCOUNTER — APPOINTMENT (OUTPATIENT)
Dept: PHYSICAL THERAPY | Age: 57
End: 2022-03-10
Attending: ORTHOPAEDIC SURGERY

## 2022-03-14 ENCOUNTER — APPOINTMENT (OUTPATIENT)
Dept: PHYSICAL THERAPY | Age: 57
End: 2022-03-14
Attending: ORTHOPAEDIC SURGERY

## 2022-03-17 ENCOUNTER — APPOINTMENT (OUTPATIENT)
Dept: PHYSICAL THERAPY | Age: 57
End: 2022-03-17
Attending: ORTHOPAEDIC SURGERY

## 2022-03-20 PROBLEM — M89.8X1 PAIN OF RIGHT SCAPULA: Status: ACTIVE | Noted: 2022-01-04

## 2022-03-21 ENCOUNTER — APPOINTMENT (OUTPATIENT)
Dept: PHYSICAL THERAPY | Age: 57
End: 2022-03-21
Attending: ORTHOPAEDIC SURGERY

## 2022-03-24 ENCOUNTER — APPOINTMENT (OUTPATIENT)
Dept: PHYSICAL THERAPY | Age: 57
End: 2022-03-24
Attending: ORTHOPAEDIC SURGERY

## 2022-04-21 PROBLEM — E11.10 DIABETIC KETOACIDOSIS WITHOUT COMA ASSOCIATED WITH TYPE 2 DIABETES MELLITUS (HCC): Status: ACTIVE | Noted: 2021-07-29

## 2022-04-21 PROBLEM — E11.69 ONYCHOMYCOSIS OF MULTIPLE TOENAILS WITH TYPE 2 DIABETES MELLITUS AND PERIPHERAL NEUROPATHY (HCC): Status: ACTIVE | Noted: 2020-11-19

## 2022-04-21 PROBLEM — E11.42 ONYCHOMYCOSIS OF MULTIPLE TOENAILS WITH TYPE 2 DIABETES MELLITUS AND PERIPHERAL NEUROPATHY (HCC): Status: ACTIVE | Noted: 2020-11-19

## 2022-04-21 PROBLEM — M25.50 POLYARTHRALGIA: Status: ACTIVE | Noted: 2021-10-28

## 2022-04-21 PROBLEM — B35.1 ONYCHOMYCOSIS OF MULTIPLE TOENAILS WITH TYPE 2 DIABETES MELLITUS AND PERIPHERAL NEUROPATHY (HCC): Status: ACTIVE | Noted: 2020-11-19

## 2022-04-21 PROBLEM — I10 ESSENTIAL HYPERTENSION: Status: ACTIVE | Noted: 2017-02-10

## 2022-04-21 PROBLEM — G62.9 SENSORY NEUROPATHY: Status: ACTIVE | Noted: 2021-10-28

## 2022-04-21 PROBLEM — E11.69 DIABETES MELLITUS TYPE 2 IN OBESE (HCC): Status: ACTIVE | Noted: 2021-07-29

## 2022-04-21 PROBLEM — N52.03 COMBINED ARTERIAL INSUFFICIENCY AND CORPORO-VENOUS OCCLUSIVE ERECTILE DYSFUNCTION: Status: ACTIVE | Noted: 2017-08-21

## 2022-04-21 PROBLEM — E66.9 DIABETES MELLITUS TYPE 2 IN OBESE (HCC): Status: ACTIVE | Noted: 2021-07-29

## 2022-04-21 PROBLEM — E87.29 KETOACIDOSIS: Status: ACTIVE | Noted: 2021-07-29

## 2022-05-25 ENCOUNTER — TELEPHONE (OUTPATIENT)
Dept: ORTHOPEDIC SURGERY | Age: 57
End: 2022-05-25

## 2022-05-26 NOTE — TELEPHONE ENCOUNTER
Left VM for pt letting him know that I apologize he hasn't been contacted by Dr. Elizabeth Anderson office. I resent his ref to Dr. Barron Ferrer this morning and also gave pt the office number for him to reach out to them as well to get scheduled. Told him to give us a call back if he had any further questions.